# Patient Record
Sex: FEMALE | Race: WHITE | URBAN - METROPOLITAN AREA
[De-identification: names, ages, dates, MRNs, and addresses within clinical notes are randomized per-mention and may not be internally consistent; named-entity substitution may affect disease eponyms.]

---

## 2023-10-17 ENCOUNTER — OFFICE VISIT (OUTPATIENT)
Age: 38
End: 2023-10-17

## 2023-10-17 VITALS — WEIGHT: 180 LBS | HEIGHT: 65 IN | BODY MASS INDEX: 29.99 KG/M2

## 2023-10-17 DIAGNOSIS — M21.619 BUNION: Primary | ICD-10-CM

## 2023-10-17 NOTE — PROGRESS NOTES
[address] [facility] [address]       DIAGNOSTIC IMAGING      Orders Placed This Encounter   Procedures    [99131] Foot Min 3V     Order Specific Question:   Are you Pregnant? Answer:   No    [59704] Foot Min 3V     Order Specific Question:   Are you Pregnant? Answer:   No      [facility] LOCATION     FOOT X RAYS 3 VIEWS Right  // AP, lateral, oblique images  10/17/2023    WE[facility]ARING    X RAYS AT [facility]  10/17/2023      Bones: No fractures or dislocations. No focal osteolytic or osteoblastic process     Bone Spurs: No significant bone spurs  Foot Alignment: Bunion alignment, is present, mild to moderate  Joint Condition: No Significant OA  Soft Tissues: Normal, No radiopaque foreign body and No abnormal calcific densities to soft tissues   No ankle joint effusion in lateral projection. Mineralization: Suggests  no Osteopenia    I have personally reviewed the results of the above study and the interpretation of this study is my professional opinion            FOOT X RAYS 3 VIEWS LEFT//  AP, lateral, oblique images  10/17/2023    WEIGHT [facility]ARING    X RAYS AT [facility]  10/17/2023      Bones: No fractures or dislocations. No focal osteolytic or osteoblastic process     Bone Spurs: No significant bone spurs  Foot Alignment: Mild to moderate bunion alignment is seen  Joint Condition: No Significant OA  Soft Tissues: Normal, No radiopaque foreign body and No abnormal calcific densities to soft tissues   No ankle joint effusion in lateral projection.   Mineralization: Suggests  no Osteopenia    I have personally reviewed the results of the above study and the interpretation of this study is my professional opinion     Michael Nielson MD  10/17/2023  8:36 AM